# Patient Record
Sex: MALE | Race: WHITE | NOT HISPANIC OR LATINO | Employment: FULL TIME | URBAN - METROPOLITAN AREA
[De-identification: names, ages, dates, MRNs, and addresses within clinical notes are randomized per-mention and may not be internally consistent; named-entity substitution may affect disease eponyms.]

---

## 2023-11-24 ENCOUNTER — OFFICE VISIT (OUTPATIENT)
Dept: FAMILY MEDICINE CLINIC | Facility: CLINIC | Age: 23
End: 2023-11-24
Payer: COMMERCIAL

## 2023-11-24 VITALS
DIASTOLIC BLOOD PRESSURE: 70 MMHG | HEART RATE: 68 BPM | RESPIRATION RATE: 16 BRPM | SYSTOLIC BLOOD PRESSURE: 120 MMHG | OXYGEN SATURATION: 98 % | WEIGHT: 149 LBS | HEIGHT: 73 IN | BODY MASS INDEX: 19.75 KG/M2

## 2023-11-24 DIAGNOSIS — F41.1 GENERALIZED ANXIETY DISORDER: ICD-10-CM

## 2023-11-24 DIAGNOSIS — Z23 FLU VACCINE NEED: ICD-10-CM

## 2023-11-24 DIAGNOSIS — Z23 ENCOUNTER FOR IMMUNIZATION: ICD-10-CM

## 2023-11-24 DIAGNOSIS — Z00.00 ENCOUNTER FOR ANNUAL PHYSICAL EXAM: Primary | ICD-10-CM

## 2023-11-24 PROCEDURE — 90471 IMMUNIZATION ADMIN: CPT | Performed by: FAMILY MEDICINE

## 2023-11-24 PROCEDURE — 90686 IIV4 VACC NO PRSV 0.5 ML IM: CPT | Performed by: FAMILY MEDICINE

## 2023-11-24 PROCEDURE — 99385 PREV VISIT NEW AGE 18-39: CPT | Performed by: FAMILY MEDICINE

## 2023-11-24 RX ORDER — SERTRALINE HYDROCHLORIDE 25 MG/1
TABLET, FILM COATED ORAL
COMMUNITY
Start: 2023-11-04 | End: 2023-11-27 | Stop reason: SDUPTHER

## 2023-11-24 NOTE — PROGRESS NOTES
Subjective:      Patient ID: Uma Pearce is a 26 y.o. male. 37-year-old male presents as new patient to the practice for annual physical examination and to establish care. Family moved to the area little over 1 year ago. Patient attends [employer] and is in his [employer]estina year. Does have history of generalized anxiety disorder. This is being treated by prior PCP Dr. Radha Man and patient has been on sertraline 25 mg once daily which has worked very well. i reportedly had screening blood work done not all that long ago. Did not receive flu vaccine this year        No past medical history on file. Family History   Problem Relation Age of Onset   • Thyroid disease Mother        No past surgical history on file. reports that he has never smoked. He has never used smokeless tobacco.      Current Outpatient Medications:   •  sertraline (ZOLOFT) 25 mg tablet, , Disp: , Rfl:     The following portions of the patient's history were reviewed and updated as appropriate: allergies, current medications, past family history, past medical history, past social history, past surgical history and problem list.    Review of Systems   Constitutional: Negative. HENT: Negative. Eyes: Negative. Respiratory: Negative. Cardiovascular: Negative. Gastrointestinal: Negative. Endocrine: Negative. Genitourinary: Negative. Musculoskeletal: Negative. Skin: Negative. Allergic/Immunologic: Negative. Neurological: Negative. Hematological: Negative. Psychiatric/Behavioral: Negative. All other systems reviewed and are negative. Objective:    /70   Pulse 68   Resp 16   Ht 6' 1" (1.854 m)   Wt 67.6 kg (149 lb)   SpO2 98%   BMI 19.66 kg/m²      Physical Exam  Vitals and nursing note reviewed. Constitutional:       General: He is not in acute distress. Appearance: Normal appearance. He is well-developed and normal weight. He is not ill-appearing.    HENT: Head: Normocephalic and atraumatic. Right Ear: Tympanic membrane, ear canal and external ear normal.      Left Ear: Tympanic membrane, ear canal and external ear normal.      Nose: Nose normal.      Mouth/Throat:      Mouth: Mucous membranes are moist.      Pharynx: Oropharynx is clear. Eyes:      Extraocular Movements: Extraocular movements intact. Conjunctiva/sclera: Conjunctivae normal.      Pupils: Pupils are equal, round, and reactive to light. Cardiovascular:      Rate and Rhythm: Normal rate and regular rhythm. Pulses: Normal pulses. Heart sounds: Normal heart sounds. No murmur heard. Pulmonary:      Effort: Pulmonary effort is normal.      Breath sounds: Normal breath sounds. Abdominal:      General: Abdomen is flat. Bowel sounds are normal.      Palpations: Abdomen is soft. Genitourinary:     Penis: Normal.       Testes: Normal.   Musculoskeletal:         General: Normal range of motion. Cervical back: Normal range of motion and neck supple. Skin:     General: Skin is warm and dry. Neurological:      General: No focal deficit present. Mental Status: He is alert and oriented to person, place, and time. Mental status is at baseline. Psychiatric:         Mood and Affect: Mood normal.         Behavior: Behavior normal.         Thought Content: Thought content normal.         Judgment: Judgment normal.           No results found for this or any previous visit (from the past 1008 hour(s)). Assessment/Plan:    Encounter for annual physical exam  Annual physical examination performed    -Offered to order screening blood work for patient. He declines at this time. Encounter for immunization  Flu vaccine provided in the office    Generalized anxiety disorder  This is being managed by his prior internist.  Drake Heads on sertraline 25 mg once daily which reportedly has been working well for him.   At any time I can assume management of writing this prescription and following up in regards to his generalized anxiety disorder          Problem List Items Addressed This Visit        Other    Encounter for annual physical exam - Primary     Annual physical examination performed    -Offered to order screening blood work for patient. He declines at this time. Encounter for immunization     Flu vaccine provided in the office         Relevant Orders    influenza vaccine, quadrivalent, 0.5 mL, preservative-free, for adult and pediatric patients 6 mos+ (AFLURIA, FLUARIX, FLULAVAL, FLUZONE) (Completed)    Flu vaccine need    Generalized anxiety disorder     This is being managed by his prior internist.  Oscar Hopes on sertraline 25 mg once daily which reportedly has been working well for him.   At any time I can assume management of writing this prescription and following up in regards to his generalized anxiety disorder         Relevant Medications    sertraline (ZOLOFT) 25 mg tablet

## 2023-11-24 NOTE — ASSESSMENT & PLAN NOTE
This is being managed by his prior internist.  Abdiaziz Pages on sertraline 25 mg once daily which reportedly has been working well for him.   At any time I can assume management of writing this prescription and following up in regards to his generalized anxiety disorder

## 2023-11-24 NOTE — TELEPHONE ENCOUNTER
Patient saw Dr. Sofy Lozada today and would like to have him continue refilling this medication as previous doctor is out of area and patient is no longer seen there. Patient reports the signature is 25 mg tablet BID. Reason for call:   [x] Refill   [] Prior Auth  [] Other:     Office:   [x] PCP/Provider - Sofy Lozada   [] Specialty/Provider -     Medication: Sertraline 25 mg    Dose/Frequency: Patient reports the signature is 25 mg tablet BID. Quantity: 180    Pharmacy: [facility]    Does the patient have enough for 3 days?    [x] Yes   [] No - Send as HP to POD

## 2023-11-24 NOTE — ASSESSMENT & PLAN NOTE
Annual physical examination performed    -Offered to order screening blood work for patient. He declines at this time.

## 2023-11-27 DIAGNOSIS — F41.1 GENERALIZED ANXIETY DISORDER: Primary | ICD-10-CM

## 2023-11-27 PROBLEM — H93.25 AUDITORY PROCESSING DISORDER: Status: ACTIVE | Noted: 2023-11-27

## 2023-11-27 RX ORDER — SERTRALINE HYDROCHLORIDE 25 MG/1
25 TABLET, FILM COATED ORAL 2 TIMES DAILY
Qty: 180 TABLET | Refills: 0 | OUTPATIENT
Start: 2023-11-27

## 2023-11-27 RX ORDER — SERTRALINE HYDROCHLORIDE 25 MG/1
25 TABLET, FILM COATED ORAL DAILY
Qty: 90 TABLET | Refills: 1 | Status: SHIPPED | OUTPATIENT
Start: 2023-11-27

## 2023-11-27 NOTE — TELEPHONE ENCOUNTER
Patient's mother called about the message left for the patient. Patient is no longer seeing previous provider, she wants to make sure that he has in his chart that he has autism (functioning) and auditory processing delay. So when asked a question he may get confused. Dr Charo Vazquez is now strictly the patient's PCP. She is asking if the refill could please be sent into [facility].  Mother advised that if there are any questions you can reach her at [phone]

## 2023-11-27 NOTE — TELEPHONE ENCOUNTER
Yes, none of that was mentioned by him at his appointment.   We had a good conversation about his academics, college and who was prescribing his sertraline I will assume management and send prescription to mail order pharmacy

## 2023-11-27 NOTE — TELEPHONE ENCOUNTER
I specifically asked him if he was still going to the prior prescriber which he said "yes, he was still seeing him"

## 2024-06-05 DIAGNOSIS — F41.1 GENERALIZED ANXIETY DISORDER: ICD-10-CM

## 2024-06-06 RX ORDER — SERTRALINE HYDROCHLORIDE 25 MG/1
25 TABLET, FILM COATED ORAL DAILY
Qty: 90 TABLET | Refills: 1 | Status: CANCELLED | OUTPATIENT
Start: 2024-06-06

## 2024-06-06 NOTE — TELEPHONE ENCOUNTER
Please clarify with patient.  SSRI therapy is a once daily medication.  Zoloft is a once a day medication.  Not that it is harmful if he is taking 25 mg twice a day however 6 months ago he only received a 6-month supply with instructions for taking once daily.  If he was taking twice daily then he should have run out after 3 months

## 2024-06-06 NOTE — TELEPHONE ENCOUNTER
Refill must be reviewed and completed by the office or provider. The refill is unable to be approved or denied by the medication management team.      Per message below patient taking differently. Please review to see if the refill is appropriate.

## 2024-06-07 DIAGNOSIS — F41.1 GENERALIZED ANXIETY DISORDER: ICD-10-CM

## 2024-06-07 NOTE — TELEPHONE ENCOUNTER
Pt calling stating that he feels more comfortable taking the sertraline 25 mgs 2 times a day. Pt states that's how he always took medication pt states insurance always covered medication being written that way  Please advise

## 2024-06-07 NOTE — TELEPHONE ENCOUNTER
Patient called the RX Refill Line. Message is being forwarded to the office.     Patient called back to clarify he is taking the Zoloft 2 times a day.    Please review and advise the patient directly    Please contact patient at  [phone]

## 2024-06-07 NOTE — TELEPHONE ENCOUNTER
Please call and inform the patient that I am sending a prescription for sertraline 50 mg to be taken once daily.  Sertraline is not a twice daily medication.  If I order it that way then insurance may come back and deny it.  Send as 50 mg once daily.  Do not double on dosage of medication

## 2024-06-10 DIAGNOSIS — F41.1 GENERALIZED ANXIETY DISORDER: ICD-10-CM

## 2024-06-10 RX ORDER — SERTRALINE HYDROCHLORIDE 25 MG/1
25 TABLET, FILM COATED ORAL 2 TIMES DAILY
Qty: 180 TABLET | Refills: 1 | Status: SHIPPED | OUTPATIENT
Start: 2024-06-10

## 2024-06-10 NOTE — TELEPHONE ENCOUNTER
Will send for twice daily dosing.  SSRI therapy is normally once daily dosing and there is no benefit to twice daily dosing.

## 2024-06-13 ENCOUNTER — TELEPHONE (OUTPATIENT)
Age: 24
End: 2024-06-13

## 2024-06-13 NOTE — TELEPHONE ENCOUNTER
Patient called the RX Refill Line. Message is being forwarded to the office.     Patient is requesting sertraline (ZOLOFT) 25 mg script to say twice a day, 90 Tablets, because he only received 40 tabs and taking it twice a day like Dr. Richar Tavarez, instructed he is going to run out to soon.    Please contact patient at [phone]

## 2024-11-19 DIAGNOSIS — F41.1 GENERALIZED ANXIETY DISORDER: ICD-10-CM

## 2024-11-20 RX ORDER — SERTRALINE HYDROCHLORIDE 25 MG/1
25 TABLET, FILM COATED ORAL 2 TIMES DAILY
Qty: 180 TABLET | Refills: 0 | Status: SHIPPED | OUTPATIENT
Start: 2024-11-20

## 2025-02-17 DIAGNOSIS — F41.1 GENERALIZED ANXIETY DISORDER: ICD-10-CM

## 2025-02-17 RX ORDER — SERTRALINE HYDROCHLORIDE 25 MG/1
25 TABLET, FILM COATED ORAL 2 TIMES DAILY
Qty: 60 TABLET | Refills: 0 | Status: SHIPPED | OUTPATIENT
Start: 2025-02-17

## 2025-02-17 NOTE — TELEPHONE ENCOUNTER
"1 month supply of sertraline sent to the pharmacy.  He was only ever seen November 2023.  That is over 1 year.  He should not have even been given a \"courtesy\" refill for 3-month supply back in November  "